# Patient Record
(demographics unavailable — no encounter records)

---

## 2024-10-17 NOTE — HISTORY OF PRESENT ILLNESS
[de-identified] : RASH/ SCAB ON FACE  [FreeTextEntry6] : PER MOM, SCAB ON FACE BEGAN TO APPEAR YESTERDAY  MOM NOTED DRY SKIN FOR 2WEEKS MOM STATED SHE APPLIED "PURE COCONUT OIL" TO AREA.

## 2024-10-17 NOTE — DISCUSSION/SUMMARY
[FreeTextEntry1] : reassured for acne may dip finger in baby oil and apply on eyelashes once loose may remove inst given obs

## 2024-11-02 NOTE — PHYSICAL EXAM
[Alert] : alert [Acute Distress] : no acute distress [Normocephalic] : normocephalic [Flat Open Anterior [place]] : flat open anterior fontanelle [PERRL] : PERRL [Red Reflex Bilateral] : red reflex bilateral [Normally Placed Ears] : normally placed ears [Auricles Well Formed] : auricles well formed [Clear Tympanic membranes] : clear tympanic membranes [Light reflex present] : light reflex present [Bony landmarks visible] : bony landmarks visible [Discharge] : no discharge [Nares Patent] : nares patent [Palate Intact] : palate intact [Uvula Midline] : uvula midline [Supple, full passive range of motion] : supple, full passive range of motion [Palpable Masses] : no palpable masses [Symmetric Chest Rise] : symmetric chest rise [Clear to Auscultation Bilaterally] : clear to auscultation bilaterally [Regular Rate and Rhythm] : regular rate and rhythm [S1, S2 present] : S1, S2 present [Murmurs] : no murmurs [+2 Femoral Pulses] : +2 femoral pulses [Soft] : soft [Tender] : nontender [Distended] : not distended [Bowel Sounds] : bowel sounds present [Hepatomegaly] : no hepatomegaly [Splenomegaly] : no splenomegaly [Normal external genitailia] : normal external genitalia [Central Urethral Opening] : central urethral opening [Testicles Descended Bilaterally] : testicles descended bilaterally [Normally Placed] : normally placed [No Abnormal Lymph Nodes Palpated] : no abnormal lymph nodes palpated [[place]ern[place]-Ortolani] : negative Jern[place]-Ortolani [Symmetric Flexed Extremities] : symmetric flexed extremities [Spinal Dimple] : no spinal dimple [Tuft of Hair] : no tuft of hair [Startle Reflex] : startle reflex present [Suck Reflex] : suck reflex present [Rooting] : rooting reflex present [Palmar Grasp] : palmar grasp reflex present [Plantar Grasp] : plantar grasp reflex present [Symmetric Willa] : symmetric [place] [Jaundice] : no jaundice [Rash and/or lesion present] : no rash/lesion

## 2024-11-02 NOTE — DISCUSSION/SUMMARY
[Normal Growth] : growth [Normal Development] : development  [No Elimination Concerns] : elimination [Continue Regimen] : feeding [No Skin Concerns] : skin [Normal Sleep Pattern] : sleep [None] : no medical problems [Anticipatory Guidance Given] : Anticipatory guidance addressed as per the history of present illness section [Age Approp Vaccines] : Age appropriate vaccines administered [No Medications] : ~He/She~ is not on any medications [Parent/Guardian] : Parent/Guardian [FreeTextEntry1] : Recommend exclusive breastfeeding, 8-12 feedings per day. Mother should continue prenatal vitamins and avoid alcohol. If formula is needed, recommend iron-fortified formulations, 2-4 oz every 2-3 hrs. When in car, patient should be in rear-facing car seat in back seat. Put baby to sleep on back, in own crib with no loose or soft bedding. Help baby to develop sleep and feeding routines. May offer pacifier if needed. Start tummy time when awake. Limit baby's exposure to others, especially those with fever or unknown vaccine status. Parents counseled to call if rectal temperature >100.4 degrees F.

## 2024-11-02 NOTE — DEVELOPMENTAL MILESTONES
[Passed] : passed [Normal Development] : Normal Development [None] : none [FreeTextEntry1] : Prone: turns head, clearing surface, chin upVentral suspension:  Lifts head to plane of bodySupine:  Relaxed, in TNA,  head lags on pull to sittingVisual/social:  Watches person, follows moving object, movements in armani, may smile.Reflex:  [place] reflexes persist

## 2024-11-02 NOTE — HISTORY OF PRESENT ILLNESS
[Breast milk] : breast milk [Formula ___ oz/feed] : [unfilled] oz of formula per feed [Normal] : Normal [No] : No cigarette smoke exposure [Water heater temperature set at <120 degrees F] : Water heater temperature set at <120 degrees F [Rear facing car seat in back seat] : Rear facing car seat in back seat [Carbon Monoxide Detectors] : Carbon monoxide detectors at home [Smoke Detectors] : Smoke detectors at home. [At risk for exposure to TB] : Not at risk for exposure to Tuberculosis

## 2024-11-02 NOTE — PHYSICAL EXAM
[Alert] : alert [Acute Distress] : no acute distress [Normocephalic] : normocephalic [Flat Open Anterior [place]] : flat open anterior fontanelle [PERRL] : PERRL [Red Reflex Bilateral] : red reflex bilateral [Normally Placed Ears] : normally placed ears [Auricles Well Formed] : auricles well formed [Clear Tympanic membranes] : clear tympanic membranes [Light reflex present] : light reflex present [Bony landmarks visible] : bony landmarks visible [Discharge] : no discharge [Nares Patent] : nares patent [Palate Intact] : palate intact [Uvula Midline] : uvula midline [Supple, full passive range of motion] : supple, full passive range of motion [Palpable Masses] : no palpable masses [Symmetric Chest Rise] : symmetric chest rise [Clear to Auscultation Bilaterally] : clear to auscultation bilaterally [Regular Rate and Rhythm] : regular rate and rhythm [S1, S2 present] : S1, S2 present [Murmurs] : no murmurs [+2 Femoral Pulses] : +2 femoral pulses [Soft] : soft [Tender] : nontender [Distended] : not distended [Bowel Sounds] : bowel sounds present [Hepatomegaly] : no hepatomegaly [Splenomegaly] : no splenomegaly [Normal external genitailia] : normal external genitalia [Central Urethral Opening] : central urethral opening [Testicles Descended Bilaterally] : testicles descended bilaterally [Normally Placed] : normally placed [No Abnormal Lymph Nodes Palpated] : no abnormal lymph nodes palpated [Jernigan-Ortolani] : negative Jernigan-Ortolani [Symmetric Flexed Extremities] : symmetric flexed extremities [Spinal Dimple] : no spinal dimple [Tuft of Hair] : no tuft of hair [Startle Reflex] : startle reflex present [Suck Reflex] : suck reflex present [Rooting] : rooting reflex present [Palmar Grasp] : palmar grasp reflex present [Plantar Grasp] : plantar grasp reflex present [Symmetric Willa] : symmetric [place] [Jaundice] : no jaundice [Rash and/or lesion present] : no rash/lesion

## 2024-11-02 NOTE — DEVELOPMENTAL MILESTONES
[Passed] : passed [Normal Development] : Normal Development [None] : none [FreeTextEntry1] : Prone: turns head, clearing surface, chin upVentral suspension:  Lifts head to plane of bodySupine:  Relaxed, in TNA,  head lags on pull to sittingVisual/social:  Watches person, follows moving object, movements in armani, may smile.Reflex:  [place] reflexes persist Estimated Blood Loss (Cc): minimal